# Patient Record
Sex: FEMALE | Race: WHITE | Employment: FULL TIME | URBAN - METROPOLITAN AREA
[De-identification: names, ages, dates, MRNs, and addresses within clinical notes are randomized per-mention and may not be internally consistent; named-entity substitution may affect disease eponyms.]

---

## 2017-07-14 ENCOUNTER — HOSPITAL ENCOUNTER (OUTPATIENT)
Dept: MRI IMAGING | Age: 62
Discharge: HOME | End: 2017-07-14
Attending: FAMILY MEDICINE
Payer: OTHER MISCELLANEOUS

## 2017-07-14 DIAGNOSIS — S49.91XA RIGHT SHOULDER INJURY, INITIAL ENCOUNTER: ICD-10-CM

## 2017-07-14 PROCEDURE — 73221 MRI JOINT UPR EXTREM W/O DYE: CPT

## 2017-08-21 ENCOUNTER — APPOINTMENT (OUTPATIENT)
Dept: PHYSICAL THERAPY | Age: 62
End: 2017-08-21

## 2017-09-18 ENCOUNTER — HOSPITAL ENCOUNTER (OUTPATIENT)
Dept: PHYSICAL THERAPY | Age: 62
Discharge: HOME | End: 2017-09-18
Payer: OTHER MISCELLANEOUS

## 2017-09-18 PROCEDURE — 97140 MANUAL THERAPY 1/> REGIONS: CPT

## 2017-09-18 PROCEDURE — 97535 SELF CARE MNGMENT TRAINING: CPT

## 2017-09-18 PROCEDURE — 97162 PT EVAL MOD COMPLEX 30 MIN: CPT

## 2017-09-18 NOTE — PROGRESS NOTES
PHYSICAL THERAPY - DAILY TREATMENT NOTE    Patient Name: Daniel Choe        Date: 2017  : 1955   YES Patient  Verified  Visit #:     Insurance: Payor: Micki Asa / Plan: [id number] / Product Type: Workers Comp /      In time: 10:15 Out time: 10:45   Total Treatment Time: 30     Medicare Time Tracking (below)   Total Timed Codes (min):  na 1:1 Treatment Time:  na     TREATMENT AREA =  Right shoulder pain [M25.511]    SUBJECTIVE  Pain Level (on 0 to 10 scale):    Medication Changes/New allergies or changes in medical history, any new surgeries or procedures? NO    If yes, update Summary List   Subjective Functional Status/Changes:  []  No changes reported     SEE IE          OBJECTIVE    10 min Manual Therapy: PROM FLx ER   Rationale:      decrease pain, increase ROM and increase tissue extensibility to improve patient's ability to reach/lift     min Patient Education:  YES  Reviewed HEP   []  Progressed/Changed HEP based on: Other Objective/Functional Measures:    SEE IE     Post Treatment Pain Level (on 0 to 10) scale:       ASSESSMENT  Assessment/Changes in Function:     SEE IE     []  See Progress Note/Recertification   Patient will continue to benefit from skilled PT services to modify and progress therapeutic interventions, address functional mobility deficits, address ROM deficits, address strength deficits, analyze and address soft tissue restrictions, analyze and cue movement patterns, analyze and modify body mechanics/ergonomics and assess and modify postural abnormalities to attain remaining goals.    Progress toward goals / Updated goals:         PLAN  []  Upgrade activities as tolerated YES Continue plan of care   []  Discharge due to :    []  Other:      Therapist: Earnestine Porras, PT, OCS, SCS, CSCS    Date: 2017 Time: 10:56 AM       Future Appointments  Date Time Provider Nicole Noguera   2017 3:30 PM Abbey Gill Yarelis Duggan [facility]   9/26/2017 1:00 PM Ledon Pluck, PTA [facility]   9/28/2017 12:00 PM Ledon Pluck, PTA [facility]   10/3/2017 1:00 PM Ledon Pluck, PTA [facility]   10/5/2017 12:00 PM Ledon Pluck, PTA [facility]   10/10/2017 1:30 PM Ledon Pluck, PTA [facility]   10/12/2017 12:00 PM Ledon Pluck, PTA [facility]   10/17/2017 1:00 PM Ledon Pluck, PTA [facility]   10/19/2017 2:00 PM Ledon Pluck, PTA [facility]

## 2017-09-18 NOTE — PROGRESS NOTES
[facility] PHYSICAL THERAPY  [address] [place] [address] [address], [address],[facility], [facility] - Phone: [phone]  Fax: [phone] / [address] [facility]  Patient Name: Sandeep Womack : 1955   Medical   Diagnosis: Right shoulder pain [M25.511] Treatment Diagnosis: Right shoulder pain [M25.511]   Onset Date: 9/15/17     Referral Source: Lauren Diaz MD [place] [facility]): 2017   Prior Hospitalization: See medical history Provider #: [id number]   Prior Level of Function: Pain free ADLs   Comorbidities: Fibromyalgia, HTN   Medications: Verified on Patient Summary List   The Plan of Care and following information is based on the information from the initial evaluation.   ===========================================================================================  Assessment / key information:  Sandeep Womack is a 63 y.o.  yo female with Dx of Right shoulder pain [M25.511]. She reports that she injured her R shoulder when she tried to reposition a patient at work on 2017. She had a rotator cuff repair/biceps tenodesis on 9/15/17. She currently rates her pain as 10/10 at worst, 6/10 at best, primarily located at anterior aspect of her R shoulder. Ojective Findings:  R Shoulder PROM: Flx:48 deg, ER: 2 deg. Pt instructed in HEP and will f/u in clinic for PT.  ===========================================================================================  Eval Complexity: History MEDIUM  Complexity : 1-2 comorbidities / personal factors will impact the outcome/ POC ;  Examination  MEDIUM Complexity : 3 Standardized tests and measures addressing body structure, function, activity limitation and / or participation in recreation ; Presentation MEDIUM Complexity : Evolving with changing characteristics ;   Decision Making MEDIUM Complexity : FOTO score of 26-74; Overall Complexity MEDIUM  Problem List: pain affecting function, decrease ROM, decrease strength, decrease ADL/ functional abilitiies, decrease activity tolerance and decrease flexibility/ joint mobility   Treatment Plan may include any combination of the following: Therapeutic exercise, Therapeutic activities, Neuromuscular re-education, Physical agent/modality, Manual therapy, Patient education, Self Care training and Functional mobility training  Patient / Family readiness to learn indicated by: asking questions, trying to perform skills and interest  Persons(s) to be included in education: patient (P)  Barriers to Learning/Limitations: no  Measures taken: FOTO = 29%   Patient Goal (s): Decrease pain    Patient self reported health status: good  Rehabilitation Potential: good   Short Term Goals: To be accomplished in  1-2  weeks:  1. Independent with HEP. 2. Decrease max pain 25-50% to assist with reaching/lifting    Long Term Goals: To be accomplished in  3-4  weeks:  1. Decrease max pain 50-75% to assist with reaching/lifting  2. Increase FOTO score to 40% to show functional improvment  3. Increase R shoulder PROM Flx and ER to WNL to assist with ADLS  Frequency / Duration:   Patient to be seen  2-3  times per week for 3-4  weeks:  Patient / Caregiver education and instruction: self care and exercises    Therapist Signature: Ludmila Connors DPT, OCS, SCS, CSCS Date: 5/74/7892   Certification Period: na Time: 10:57 AM   =================================================================================I certify that the above Physical Therapy Services are being furnished while the patient is under my care. I agree with the treatment plan and certify that this therapy is necessary. Physician Signature:        Date:       Time:     Please sign and return to [facility] or you may fax the signed copy to [phone]. Thank you.

## 2017-09-20 ENCOUNTER — HOSPITAL ENCOUNTER (OUTPATIENT)
Dept: PHYSICAL THERAPY | Age: 62
Discharge: HOME | End: 2017-09-20
Payer: OTHER MISCELLANEOUS

## 2017-09-20 PROCEDURE — 97110 THERAPEUTIC EXERCISES: CPT

## 2017-09-20 PROCEDURE — 97140 MANUAL THERAPY 1/> REGIONS: CPT

## 2017-09-20 NOTE — PROGRESS NOTES
PHYSICAL THERAPY - DAILY TREATMENT NOTE    Patient Name: Sharri Dawn        Date: 2017  : 1955   YES Patient  Verified  Visit #:     Insurance: Payor: Hanane Wolf / Plan: [id number] [facility] / Product Type: Workers Comp /      In time: 330 Out time: 415   Total Treatment Time: 39     Medicare Time Tracking (below)   Total Timed Codes (min):  35 1:1 Treatment Time:       TREATMENT AREA =  Right shoulder pain [M25.511]    SUBJECTIVE  Pain Level (on 0 to 10 scale):  7  / 10   Medication Changes/New allergies or changes in medical history, any new surgeries or procedures? NO    If yes, update Summary List   Subjective Functional Status/Changes:  []  No changes reported     \"The shoulder is hurting pretty bad. I feel like my neck and shoulders are really tense. \"        OBJECTIVE  Modalities Rationale:     decrease inflammation and decrease pain to improve patient's ability to perform pain free ADLs. min [] Estim, type/location:                                      []  att     []  unatt     []  w/US     []  w/ice    []  w/heat    min []  Mechanical Traction: type/lbs                   []  pro   []  sup   []  int   []  cont    []  before manual    []  after manual    min []  Ultrasound, settings/location:      min []  Iontophoresis w/ dexamethasone, location:                                               []  take home patch       []  in clinic   10 min [x]  Ice     []  Heat    location/position: Supine, (R) shoulder. min []  Vasopneumatic Device, press/temp:     min []  Other:    [x] Skin assessment post-treatment (if applicable):    [x]  intact    []  redness- no adverse reaction     []redness  adverse reaction:        15 min Therapeutic Exercise:  [x]  See flow sheet   Rationale:      increase ROM, increase strength and improve coordination to improve the patients ability to perform pain free shoulder ROM.       20 Min Manual Therapy: (R) shoulder PROM.  Distraction w/ oscillation. Rationale:      decrease pain, increase ROM, increase tissue extensibility and decrease trigger points to improve patient's ability to perform pain free ADLs. min Patient Education:  YES  Reviewed HEP   []  Progressed/Changed HEP based on: Other Objective/Functional Measures:    Scap squeeze, post shoulder rolls, wrist flex/ext x10 ea. Reviewed HEP exercises. Post Treatment Pain Level (on 0 to 10) scale:   7  / 10     ASSESSMENT  Assessment/Changes in Function:     Gentle PROM/oscillation/distraction only. Pt requires VC's to relax UE.      []  See Progress Note/Recertification   Patient will continue to benefit from skilled PT services to modify and progress therapeutic interventions, address functional mobility deficits, address ROM deficits, address strength deficits, analyze and address soft tissue restrictions, analyze and cue movement patterns, analyze and modify body mechanics/ergonomics and assess and modify postural abnormalities to attain remaining goals. Progress toward goals / Updated goals:    Initiated therex.       PLAN  [x]  Upgrade activities as tolerated YES Continue plan of care   []  Discharge due to :    []  Other:      Therapist: Karoline Ribera PTA    Date: 9/20/2017 Time: 4:35 PM     Future Appointments  Date Time Provider Nicole Noguera   9/26/2017 1:00 PM Karoline Ribera PTA [facility]   9/28/2017 12:00 PM Karoline Ribera [facility]   10/3/2017 1:00 PM Karoline Ribera PTA [facility]   10/5/2017 12:00 PM Karoline Ribera PTA [facility]   10/10/2017 1:30 PM Karoline Ribera PTA [facility]   10/12/2017 12:00 PM Karoline Ribera PTA [facility]   10/17/2017 1:00 PM Karoline Ribera [facility]   10/19/2017 2:00 PM Karoline Ribera [facility]

## 2017-09-26 ENCOUNTER — HOSPITAL ENCOUNTER (OUTPATIENT)
Dept: PHYSICAL THERAPY | Age: 62
Discharge: HOME | End: 2017-09-26
Payer: OTHER MISCELLANEOUS

## 2017-09-26 PROCEDURE — 97140 MANUAL THERAPY 1/> REGIONS: CPT

## 2017-09-26 PROCEDURE — 97110 THERAPEUTIC EXERCISES: CPT

## 2017-09-26 NOTE — PROGRESS NOTES
PHYSICAL THERAPY - DAILY TREATMENT NOTE    Patient Name: Verenice Clarke        Date: 2017  : 1955   YES Patient  Verified  Visit #:   3   of   12  Insurance: Payor: Junior Palm / Plan: [id number] [facility] / Product Type: Workers Comp /      In time: 1 Out time: 145   Total Treatment Time: 39     Medicare Time Tracking (below)   Total Timed Codes (min):  35 1:1 Treatment Time:       TREATMENT AREA =  Right shoulder pain [M25.511]    SUBJECTIVE  Pain Level (on 0 to 10 scale):  6  / 10   Medication Changes/New allergies or changes in medical history, any new surgeries or procedures? NO    If yes, update Summary List   Subjective Functional Status/Changes:  []  No changes reported     \"I'm wearing the sling except to shower. \"  Pt states she has some trouble with the swinging exercise (pendulums) at home. OBJECTIVE  Modalities Rationale:     decrease inflammation and decrease pain to improve patient's ability to perform pain free ADLs. min [] Estim, type/location:                                      []  att     []  unatt     []  w/US     []  w/ice    []  w/heat    min []  Mechanical Traction: type/lbs                   []  pro   []  sup   []  int   []  cont    []  before manual    []  after manual    min []  Ultrasound, settings/location:      min []  Iontophoresis w/ dexamethasone, location:                                               []  take home patch       []  in clinic   10 min [x]  Ice     []  Heat    location/position: Supine, (R) shoulder. min []  Vasopneumatic Device, press/temp:     min []  Other:    [x] Skin assessment post-treatment (if applicable):    [x]  intact    []  redness- no adverse reaction     []redness  adverse reaction:        15 min Therapeutic Exercise:  [x]  See flow sheet   Rationale:      increase ROM, increase strength, improve coordination and improve balance to improve the patients ability to perform pain free ADLs. 20 min Manual Therapy: (R) shoulder PROM. STM/DTM (R) periscapular mms. Rationale:      decrease pain, increase ROM, increase tissue extensibility and decrease trigger points to improve patient's ability to perform pain free ADLs. min Patient Education:  YES  Reviewed HEP   []  Progressed/Changed HEP based on: Other Objective/Functional Measures: Added pendulums (reviewed mechanics) and supine cane ER. Unable to tolerate wt shift secondary to pain. Post Treatment Pain Level (on 0 to 10) scale:   5  / 10     ASSESSMENT  Assessment/Changes in Function:     Reviewed HEP exercises and pt demonstrates good knowledge of HEP. []  See Progress Note/Recertification   Patient will continue to benefit from skilled PT services to modify and progress therapeutic interventions, address functional mobility deficits, address ROM deficits, address strength deficits, analyze and address soft tissue restrictions, analyze and cue movement patterns, analyze and modify body mechanics/ergonomics and assess and modify postural abnormalities to attain remaining goals. Progress toward goals / Updated goals:    STG #1 met.       PLAN  [x]  Upgrade activities as tolerated YES Continue plan of care   []  Discharge due to :    []  Other:      Therapist: Dario Zhang PTA    Date: 9/26/2017 Time: 1:37 PM     Future Appointments  Date Time Provider Nicole Noguera   9/28/2017 12:00 PM Dario Zhang PTA [facility]   10/3/2017 1:00 PM Dario Zhang PTA [facility]   10/5/2017 12:00 PM Dario Zhang PTA [facility]   10/10/2017 1:30 PM Dario Zhang PTA [facility]   10/12/2017 12:00 PM Dario Zhang PTA [facility]   10/17/2017 1:00 PM Dario Zhang PTA [facility]   10/19/2017 2:00 PM Dario Zhang [facility]

## 2017-09-28 ENCOUNTER — HOSPITAL ENCOUNTER (OUTPATIENT)
Dept: PHYSICAL THERAPY | Age: 62
Discharge: HOME | End: 2017-09-28
Payer: OTHER MISCELLANEOUS

## 2017-09-28 PROCEDURE — 97140 MANUAL THERAPY 1/> REGIONS: CPT

## 2017-09-28 PROCEDURE — 97110 THERAPEUTIC EXERCISES: CPT

## 2017-09-28 NOTE — PROGRESS NOTES
PHYSICAL THERAPY - DAILY TREATMENT NOTE    Patient Name: Melissa Mendiola        Date: 2017  : 1955   YES Patient  Verified  Visit #:     Insurance: Payor: Yolanda Rodrigues / Plan: [id number] [facility] / Product Type: Workers Comp /      In time: 12 Out time: 1250   Total Treatment Time: 48     Medicare Time Tracking (below)   Total Timed Codes (min):  40 1:1 Treatment Time:       TREATMENT AREA =  Right shoulder pain [M25.511]    SUBJECTIVE  Pain Level (on 0 to 10 scale):  6  / 10   Medication Changes/New allergies or changes in medical history, any new surgeries or procedures? NO    If yes, update Summary List   Subjective Functional Status/Changes:  []  No changes reported     \"I've done the pendulums at home, I think I've done a decent job of keeping the shoulder relaxed. \"        OBJECTIVE  Modalities Rationale:     decrease inflammation and decrease pain to improve patient's ability to perform pain free ADLs. min [] Estim, type/location:                                      []  att     []  unatt     []  w/US     []  w/ice    []  w/heat    min []  Mechanical Traction: type/lbs                   []  pro   []  sup   []  int   []  cont    []  before manual    []  after manual    min []  Ultrasound, settings/location:      min []  Iontophoresis w/ dexamethasone, location:                                               []  take home patch       []  in clinic   10 min [x]  Ice     []  Heat    location/position: Supine, (R) shoulder. min []  Vasopneumatic Device, press/temp:     min []  Other:    [x] Skin assessment post-treatment (if applicable):    [x]  intact    []  redness- no adverse reaction     []redness  adverse reaction:        15 min Therapeutic Exercise:  [x]  See flow sheet   Rationale:      increase ROM, increase strength and improve coordination to improve the patients ability to perform pain free ADLs.       25 Min Manual Therapy: (R) shoulder PROM.  STM/DTM and TPR (R) periscapular mms. Rationale:      decrease pain, increase ROM, increase tissue extensibility and decrease trigger points to improve patient's ability to perform pain free ADLs. min Patient Education:  YES  Reviewed HEP   []  Progressed/Changed HEP based on: Other Objective/Functional Measures:    Able to tolerate weightshifts, performed 10x5\". Post Treatment Pain Level (on 0 to 10) scale:   5  / 10     ASSESSMENT  Assessment/Changes in Function:     No exacerbation of symptoms with today's session. []  See Progress Note/Recertification   Patient will continue to benefit from skilled PT services to modify and progress therapeutic interventions, address functional mobility deficits, address ROM deficits, address strength deficits, analyze and address soft tissue restrictions, analyze and cue movement patterns, analyze and modify body mechanics/ergonomics and assess and modify postural abnormalities to attain remaining goals. Progress toward goals / Updated goals:    No change in progress toward LTG's with today's session.       PLAN  [x]  Upgrade activities as tolerated YES Continue plan of care   []  Discharge due to :    []  Other:      Therapist: Rolf Benites PTA    Date: 9/28/2017 Time: 12:30 PM     Future Appointments  Date Time Provider Nicole Noguera   10/3/2017 1:00 PM Rolf Benites PTA [facility]   10/5/2017 12:00 PM Rolf Benites PTA [facility]   10/10/2017 1:30 PM Rolf Benites PTA [facility]   10/12/2017 12:00 PM Rolf Benites PTA [facility]   10/17/2017 1:00 PM Rolf Benites PTA [facility]   10/19/2017 2:00 PM Rolf Benites PTA [facility]

## 2017-10-03 ENCOUNTER — HOSPITAL ENCOUNTER (OUTPATIENT)
Dept: PHYSICAL THERAPY | Age: 62
Discharge: HOME | End: 2017-10-03
Payer: OTHER MISCELLANEOUS

## 2017-10-03 PROCEDURE — 97140 MANUAL THERAPY 1/> REGIONS: CPT

## 2017-10-03 PROCEDURE — 97110 THERAPEUTIC EXERCISES: CPT

## 2017-10-03 NOTE — PROGRESS NOTES
PHYSICAL THERAPY - DAILY TREATMENT NOTE    Patient Name: Concepcion Plan        Date: 10/3/2017  : 1955   YES Patient  Verified  Visit #:      12  Insurance: Payor: Tiffanieannel Boast / Plan: [id number] / Product Type: Workers Comp /      In time: 1 Out time: 150   Total Treatment Time: 48     Medicare Time Tracking (below)   Total Timed Codes (min):  40 1:1 Treatment Time:       TREATMENT AREA =  Right shoulder pain [M25.511]    SUBJECTIVE  Pain Level (on 0 to 10 scale): 6   / 10   Medication Changes/New allergies or changes in medical history, any new surgeries or procedures? NO    If yes, update Summary List   Subjective Functional Status/Changes:  []  No changes reported     \"I've been doing my exercises. Taking less pain medication, so I can feel a bit more lately. \"           OBJECTIVE  Modalities Rationale:     decrease inflammation and decrease pain to improve patient's ability to perform pain free ADLs. min [] Estim, type/location:                                      []  att     []  unatt     []  w/US     []  w/ice    []  w/heat    min []  Mechanical Traction: type/lbs                   []  pro   []  sup   []  int   []  cont    []  before manual    []  after manual    min []  Ultrasound, settings/location:      min []  Iontophoresis w/ dexamethasone, location:                                               []  take home patch       []  in clinic   10 min [x]  Ice     []  Heat    location/position: Supine, (R) shoulder. min []  Vasopneumatic Device, press/temp:     min []  Other:    [x] Skin assessment post-treatment (if applicable):    [x]  intact    []  redness- no adverse reaction     []redness  adverse reaction:        15 min Therapeutic Exercise:  [x]  See flow sheet   Rationale:      increase ROM, increase strength and improve coordination to improve the patients ability to perform pain free ADLs.        25 min Manual Therapy: (R) shoulder PROM.  STM/DTM (R) periscapular mms. STM (R) Bicep. Rationale:      decrease pain, increase ROM, increase tissue extensibility and decrease trigger points to improve patient's ability to perform pain free ADLs. min Patient Education:  YES  Reviewed HEP   []  Progressed/Changed HEP based on: Other Objective/Functional Measures:    Able to resume table slides w/o pain. Therex per flow sheet. Post Treatment Pain Level (on 0 to 10) scale:   5  / 10     ASSESSMENT  Assessment/Changes in Function:     No exacerbation of symptoms with today's session. []  See Progress Note/Recertification   Patient will continue to benefit from skilled PT services to modify and progress therapeutic interventions, address functional mobility deficits, address ROM deficits, address strength deficits, analyze and address soft tissue restrictions, analyze and cue movement patterns, analyze and modify body mechanics/ergonomics and assess and modify postural abnormalities to attain remaining goals. Progress toward goals / Updated goals:    No change in progress toward LTG's with today's session.       PLAN  [x]  Upgrade activities as tolerated YES Continue plan of care   []  Discharge due to :    []  Other:      Therapist: Cori Cisneros PTA    Date: 10/3/2017 Time: 2:41 PM     Future Appointments  Date Time Provider Nicole Noguera   10/5/2017 12:00 PM Cori Cisneros PTA [facility]   10/10/2017 1:30 PM Cori Cisneros PTA [facility]   10/12/2017 12:00 PM Cori Cisneros PTA [facility]   10/17/2017 1:00 PM Cori Cisneros PTA [facility]   10/19/2017 2:00 PM Cori Cisneros PTA [facility]

## 2017-10-05 ENCOUNTER — HOSPITAL ENCOUNTER (OUTPATIENT)
Dept: PHYSICAL THERAPY | Age: 62
Discharge: HOME | End: 2017-10-05
Payer: OTHER MISCELLANEOUS

## 2017-10-05 PROCEDURE — 97110 THERAPEUTIC EXERCISES: CPT

## 2017-10-05 PROCEDURE — 97140 MANUAL THERAPY 1/> REGIONS: CPT

## 2017-10-05 NOTE — PROGRESS NOTES
PHYSICAL THERAPY - DAILY TREATMENT NOTE    Patient Name: Osmar Ingram        Date: 10/5/2017  : 1955   YES Patient  Verified  Visit #:     Insurance: Payor: Bennett Dinh / Plan: [id number] [facility] / Product Type: Workers Comp /      In time: 1155 Out time: 1245   Total Treatment Time: 48     Medicare Time Tracking (below)   Total Timed Codes (min):  50 1:1 Treatment Time:       TREATMENT AREA =  Right shoulder pain [M25.511]    SUBJECTIVE  Pain Level (on 0 to 10 scale):  1-2  / 10   Medication Changes/New allergies or changes in medical history, any new surgeries or procedures? NO    If yes, update Summary List   Subjective Functional Status/Changes:  []  No changes reported     \"I had trouble breathing starting yesterday and today. Feels like a sharp pain on the (L) side of my chest and it runs along the side of my ribcage. \"            OBJECTIVE    30 min Therapeutic Exercise:  [x]  See flow sheet   Rationale:      increase ROM, increase strength and improve coordination to improve the patients ability to perform pain free ADLs. 20 min Manual Therapy: (R) shoulder PROM. STM/DTM and TPR (R) periscapular mms. Rationale:      decrease pain, increase ROM, increase tissue extensibility and decrease trigger points to improve patient's ability to perform pain free ADLs. min Patient Education:  YES  Reviewed HEP   []  Progressed/Changed HEP based on: Other Objective/Functional Measures: Added UT stretch to improve cervical mobility, decrease stress on the shoulder.        Post Treatment Pain Level (on 0 to 10) scale:   1-2  / 10     ASSESSMENT  Assessment/Changes in Function:     PD modalities, states she will ice it at home.       []  See Progress Note/Recertification   Patient will continue to benefit from skilled PT services to modify and progress therapeutic interventions, address functional mobility deficits, address ROM deficits, address strength deficits, analyze and address soft tissue restrictions, analyze and cue movement patterns and analyze and modify body mechanics/ergonomics to attain remaining goals. Progress toward goals / Updated goals:    No change in progress toward LTG's with today's session.       PLAN  [x]  Upgrade activities as tolerated YES Continue plan of care   []  Discharge due to :    []  Other:      Therapist: Becky Bryant PTA    Date: 10/5/2017 Time: 12:42 PM     Future Appointments  Date Time Provider Nicole Noguera   10/10/2017 1:30 PM Becky Bryant PTA [facility]   10/12/2017 12:00 PM Becky Bryant PTA [facility]   10/17/2017 1:00 PM Becky Bryant PTA [facility]   10/19/2017 2:00 PM Becky Bryant [facility]

## 2017-10-10 ENCOUNTER — APPOINTMENT (OUTPATIENT)
Dept: PHYSICAL THERAPY | Age: 62
End: 2017-10-10
Payer: OTHER MISCELLANEOUS

## 2017-10-17 ENCOUNTER — HOSPITAL ENCOUNTER (OUTPATIENT)
Dept: PHYSICAL THERAPY | Age: 62
Discharge: HOME | End: 2017-10-17
Payer: OTHER MISCELLANEOUS

## 2017-10-17 PROCEDURE — 97110 THERAPEUTIC EXERCISES: CPT

## 2017-10-17 PROCEDURE — 97140 MANUAL THERAPY 1/> REGIONS: CPT

## 2017-10-17 NOTE — PROGRESS NOTES
PHYSICAL THERAPY - DAILY TREATMENT NOTE    Patient Name: Yeni Oneil        Date: 10/17/2017  : 1955   YES Patient  Verified  Visit #:     Insurance: Payor: [employer] / Plan: [id number] [place] [employer] / Product Type: Workers Comp /      In time: 1255 Out time: 145   Total Treatment Time: 48     Medicare Time Tracking (below)   Total Timed Codes (min):  40 1:1 Treatment Time:       TREATMENT AREA =  Right shoulder pain [M25.511]    SUBJECTIVE  Pain Level (on 0 to 10 scale):  2  / 10   Medication Changes/New allergies or changes in medical history, any new surgeries or procedures? NO    If yes, update Summary List   Subjective Functional Status/Changes:  []  No changes reported     Pt reporting recent max pain of 6/10 - happened when she overused her arm during light work. OBJECTIVE  Modalities Rationale:     decrease inflammation and decrease pain to improve patient's ability to perform pain free ADLs. min [] Estim, type/location:                                      []  att     []  unatt     []  w/US     []  w/ice    []  w/heat    min []  Mechanical Traction: type/lbs                   []  pro   []  sup   []  int   []  cont    []  before manual    []  after manual    min []  Ultrasound, settings/location:      min []  Iontophoresis w/ dexamethasone, location:                                               []  take home patch       []  in clinic   10 min [x]  Ice     []  Heat    location/position: Supine, (R) shoulder. min []  Vasopneumatic Device, press/temp:     min []  Other:    [x] Skin assessment post-treatment (if applicable):    [x]  intact    []  redness- no adverse reaction     []redness  adverse reaction:        25 min Therapeutic Exercise:  [x]  See flow sheet   Rationale:      increase ROM, increase strength and improve coordination to improve the patients ability to perform pain free ADLs.       15 Min Manual Therapy: (R) shoulder PROM.  TPR (R) periscapular mms. Rationale:      decrease pain, increase ROM, increase tissue extensibility and decrease trigger points to improve patient's ability to perform pain free ADLs. min Patient Education:  YES  Reviewed HEP   []  Progressed/Changed HEP based on: Other Objective/Functional Measures:    (R) shoulder PROM = flex 106 deg, ER 36 deg     FOTO = 47   GROC = +5      Post Treatment Pain Level (on 0 to 10) scale:   0   / 10     ASSESSMENT  Assessment/Changes in Function:     See PN     []  See Progress Note/Recertification   Patient will continue to benefit from skilled PT services to modify and progress therapeutic interventions, address functional mobility deficits, address ROM deficits, address strength deficits, analyze and address soft tissue restrictions, analyze and cue movement patterns, analyze and modify body mechanics/ergonomics and assess and modify postural abnormalities to attain remaining goals.    Progress toward goals / Updated goals:    See PN     PLAN  [x]  Upgrade activities as tolerated YES Continue plan of care   []  Discharge due to :    []  Other:      Therapist: Yuly Mireles PTA    Date: 10/17/2017 Time: 1:56 PM     Future Appointments  Date Time Provider Nicole Noguera   10/19/2017 2:00 PM Yuly Mireles PTA [facility]

## 2017-10-17 NOTE — PROGRESS NOTES
[facility] PHYSICAL THERAPY  [address] [place] [address] 201,[facility], [address] [facility] - Phone: [phone]  Fax: [phone]  PROGRESS NOTE  Patient Name: Susy Prader : 1955   Treatment/Medical Diagnosis: Right shoulder pain [M25.511]   Referral Source: Cindy Aragon MD     Date of Initial Visit:  Attended Visits: 7 Missed Visits: 1     SUMMARY OF TREATMENT  PT has consisted of initial evaluation, therapeutic exercises, HEP, manual therapy, patient education, and modalities. CURRENT STATUS  [facility]p RTC Repair and biceps tenodesis. Pt currently reports 6/10 max pain which occurred when she was doing some light housework. See below for current shoulder PROM. FOTO = 47, GROC = +5. Pt demonstrates (I) and compliance with current HEP exercises. Goal/Measure of Progress Goal Met? 1. Decrease max pain 50-75% to assist with reaching/lifting   Status at last Eval: 10/10 @ worst Current Status: 6/10 @ worst  progressing   2. Increase FOTO score to 40% to show functional improvment   Status at last Eval: 29 Current Status: 47 yes   3. Increase R shoulder PROM Flx and ER to WNL to assist with ADLS   Status at last Eval: Flex 48 deg   ER 2 deg  Current Status: Flex 106 deg   ER 36 deg  progressing     New Goals to be achieved in __4__  weeks:  1. Decrease max pain to </=4/10 to assist with reaching/lifting  2. Increase FOTO score to 59% to show functional improvment  3. Increase R shoulder PROM Flx and ER to WNL to assist with ADLS  RECOMMENDATIONS  Pt to continue 2x weekly for up to an additional 4 weeks to further improve shoulder ROM, initiate shoulder AROM per MD approval, and decrease pain. Thank you for this referral.   If you have any questions/comments please contact us directly at [phone]. Thank you for allowing us to assist in the care of your patient.   LPTA Signature: Angeline Lara PTA  Date: 10/17/2017 PT Signature:  Time: 2:31 PM   NOTE TO PHYSICIAN:  PLEASE COMPLETE THE ORDERS BELOW AND FAX TO   [facility]: [phone]  If you are unable to process this request in 24 hours please contact our office: [phone]    ___ I have read the above report and request that my patient continue as recommended.   ___ I have read the above report and request that my patient continue therapy with the following changes/special instructions:_________________________________________________________   ___ I have read the above report and request that my patient be discharged from therapy.      Physician Signature:        Date:       Time:

## 2017-10-25 ENCOUNTER — HOSPITAL ENCOUNTER (OUTPATIENT)
Dept: PHYSICAL THERAPY | Age: 62
Discharge: HOME | End: 2017-10-25
Payer: OTHER MISCELLANEOUS

## 2017-10-25 PROCEDURE — 97110 THERAPEUTIC EXERCISES: CPT

## 2017-10-25 PROCEDURE — 97140 MANUAL THERAPY 1/> REGIONS: CPT

## 2017-10-25 NOTE — PROGRESS NOTES
PHYSICAL THERAPY - DAILY TREATMENT NOTE    Patient Name: Roxanne Prasad        Date: 10/25/2017  : 1955   YES Patient  Verified  Visit #:     Insurance: Payor: Derek Jackman / Plan: [id number] [facility] / Product Type: Workers Comp /      In time: [employer] time: 1225   Total Treatment Time: 54     Medicare Time Tracking (below)   Total Timed Codes (min):  45 1:1 Treatment Time:       TREATMENT AREA =  Right shoulder pain [M25.511]    SUBJECTIVE  Pain Level (on 0 to 10 scale):  4  / 10   Medication Changes/New allergies or changes in medical history, any new surgeries or procedures? NO    If yes, update Summary List   Subjective Functional Status/Changes:  []  No changes reported     Pt reporting soreness today. \"I wasn't wearing the sling, and I went to use my arm. \"   States incident happened on  and she's been sore since then. OBJECTIVE  Modalities Rationale:     decrease inflammation and decrease pain to improve patient's ability to perform pain free ADLs. min [] Estim, type/location:                                      []  att     []  unatt     []  w/US     []  w/ice    []  w/heat    min []  Mechanical Traction: type/lbs                   []  pro   []  sup   []  int   []  cont    []  before manual    []  after manual    min []  Ultrasound, settings/location:      min []  Iontophoresis w/ dexamethasone, location:                                               []  take home patch       []  in clinic   10 min [x]  Ice     []  Heat    location/position: Supine, (R) shoulder.      min []  Vasopneumatic Device, press/temp:     min []  Other:    [x] Skin assessment post-treatment (if applicable):    [x]  intact    []  redness- no adverse reaction     []redness  adverse reaction:        30 min Therapeutic Exercise:  [x]  See flow sheet   Rationale:      increase ROM, increase strength and improve coordination to improve the patients ability to perform pain free ADLs. 15 Min Manual Therapy: STM/DTM (R) periscapular mms, t/s paraspinals, post cuff, (R) shoulder PROM. Rationale:      decrease pain, increase ROM, increase tissue extensibility and decrease trigger points to improve patient's ability to perform pain free ADLs. min Patient Education:  YES  Reviewed HEP   []  Progressed/Changed HEP based on: Other Objective/Functional Measures: Added supine AAROM flex, sidelying ER, and prone row to progress into AROM activities per protocol. Post Treatment Pain Level (on 0 to 10) scale:   2  / 10     ASSESSMENT  Assessment/Changes in Function:     ROM limited by pain today. []  See Progress Note/Recertification   Patient will continue to benefit from skilled PT services to modify and progress therapeutic interventions, address functional mobility deficits, address ROM deficits, address strength deficits, analyze and address soft tissue restrictions, analyze and cue movement patterns, analyze and modify body mechanics/ergonomics and assess and modify postural abnormalities to attain remaining goals. Progress toward goals / Updated goals:    Initiated light AAROM activities.        PLAN  [x]  Upgrade activities as tolerated YES Continue plan of care   []  Discharge due to :    []  Other:      Therapist: Idalmis Cochran PTA    Date: 10/25/2017 Time: 11:47 AM     Future Appointments  Date Time Provider Nicole Noguera   11/3/2017 10:30 AM Yudith Caba PT [facility]   11/6/2017 11:30 AM Idalmis Cochran PTA [facility]   11/8/2017 11:30 AM Idalmis Cochran PTA [facility]   11/13/2017 11:30 AM Idalmis Cochran PTA [facility]   11/15/2017 11:30 AM Idalmis Cochran PTA [facility]

## 2017-10-31 ENCOUNTER — APPOINTMENT (OUTPATIENT)
Dept: PHYSICAL THERAPY | Age: 62
End: 2017-10-31
Payer: OTHER MISCELLANEOUS

## 2017-11-03 ENCOUNTER — APPOINTMENT (OUTPATIENT)
Dept: PHYSICAL THERAPY | Age: 62
End: 2017-11-03
Payer: OTHER MISCELLANEOUS

## 2017-11-08 ENCOUNTER — HOSPITAL ENCOUNTER (OUTPATIENT)
Dept: PHYSICAL THERAPY | Age: 62
Discharge: HOME | End: 2017-11-08
Payer: OTHER MISCELLANEOUS

## 2017-11-08 PROCEDURE — 97110 THERAPEUTIC EXERCISES: CPT

## 2017-11-08 PROCEDURE — 97140 MANUAL THERAPY 1/> REGIONS: CPT

## 2017-11-08 NOTE — PROGRESS NOTES
PHYSICAL THERAPY - DAILY TREATMENT NOTE    Patient Name: Melissa Mendiola        Date: 2017  : 1955   YES Patient  Verified  Visit #:     Insurance: Payor: /      In time: 330 Out time: 425   Total Treatment Time: 55     Medicare Time Tracking (below)   Total Timed Codes (min):  55 1:1 Treatment Time:       TREATMENT AREA =  Right shoulder pain [M25.511]    SUBJECTIVE  Pain Level (on 0 to 10 scale):  4  / 10   Medication Changes/New allergies or changes in medical history, any new surgeries or procedures? NO    If yes, update Summary List   Subjective Functional Status/Changes:  []  No changes reported     No new complaints. Reports compliance with HEP. OBJECTIVE  40 min Therapeutic Exercise:  [x]  See flow sheet   Rationale:      increase ROM, increase strength and improve coordination to improve the patients ability to perform pain free overhead ADLs. 15 Min Manual Therapy: (R) shoulder PROM. Rationale:      decrease pain, increase ROM, increase tissue extensibility and decrease trigger points  to improve patient's ability to perform pain free ADLs. min Patient Education:  YES  Reviewed HEP   []  Progressed/Changed HEP based on: Other Objective/Functional Measures: Therex per flow sheet. (R) shoulder flex AAROM = 118 deg    Post Treatment Pain Level (on 0 to 10) scale:   4  / 10     ASSESSMENT  Assessment/Changes in Function:     No increase in pain during today's session. []  See Progress Note/Recertification   Patient will continue to benefit from skilled PT services to modify and progress therapeutic interventions, address functional mobility deficits, address ROM deficits, address strength deficits, analyze and address soft tissue restrictions, analyze and cue movement patterns, analyze and modify body mechanics/ergonomics and assess and modify postural abnormalities to attain remaining goals.    Progress toward goals / Updated goals:    Progressing toward LTG #3 w/ improvement in shoulder AAROM.        PLAN  [x]  Upgrade activities as tolerated YES Continue plan of care   []  Discharge due to :    []  Other:      Therapist: Jing Gtz PTA    Date: 11/8/2017 Time: 4:06 PM     Future Appointments  Date Time Provider Nicole Noguera   11/13/2017 3:30 PM Jing Gtz PTA [facility]   11/15/2017 3:30 PM Jing Gtz PTA [facility]

## 2017-11-15 ENCOUNTER — HOSPITAL ENCOUNTER (OUTPATIENT)
Dept: PHYSICAL THERAPY | Age: 62
Discharge: HOME | End: 2017-11-15
Payer: OTHER MISCELLANEOUS

## 2017-11-15 PROCEDURE — 97140 MANUAL THERAPY 1/> REGIONS: CPT

## 2017-11-15 PROCEDURE — 97110 THERAPEUTIC EXERCISES: CPT

## 2017-11-15 NOTE — PROGRESS NOTES
Min Manual Therapy:  (R) shoulder PROM. STM/DTM and TPR (R) shoulder. Rationale:      decrease pain, increase ROM, increase tissue extensibility and decrease trigger points to improve patient's ability to perform pain free ADLs. min Patient Education:  YES  Reviewed HEP   []  Progressed/Changed HEP based on: Other Objective/Functional Measures: Therex per flow sheet. Added standing scap, prone horizontal abd to improve shoulder stability/strength for ADLs. PROM Flex = 128 deg  PROM ER = 45 deg      Post Treatment Pain Level (on 0 to 10) scale:   2-3  / 10     ASSESSMENT  Assessment/Changes in Function:     Steady improvement in shoulder ROM. Remains painful/tight @ end range. []  See Progress Note/Recertification   Patient will continue to benefit from skilled PT services to modify and progress therapeutic interventions, address functional mobility deficits, address ROM deficits, address strength deficits, analyze and address soft tissue restrictions, analyze and cue movement patterns, analyze and modify body mechanics/ergonomics and assess and modify postural abnormalities to attain remaining goals. Progress toward goals / Updated goals:    Progressing toward LTG #3. PLAN  [x]  Upgrade activities as tolerated YES Continue plan of care   []  Discharge due to :    []  Other:      Therapist: Rafael Chatterjee PTA    Date: 11/15/2017 Time: 4:06 PM     No future appointments.

## 2017-11-22 ENCOUNTER — HOSPITAL ENCOUNTER (OUTPATIENT)
Dept: PHYSICAL THERAPY | Age: 62
Discharge: HOME | End: 2017-11-22
Payer: OTHER MISCELLANEOUS

## 2017-11-28 ENCOUNTER — APPOINTMENT (OUTPATIENT)
Dept: PHYSICAL THERAPY | Age: 62
End: 2017-11-28
Payer: OTHER MISCELLANEOUS

## 2017-11-30 ENCOUNTER — APPOINTMENT (OUTPATIENT)
Dept: PHYSICAL THERAPY | Age: 62
End: 2017-11-30
Payer: OTHER MISCELLANEOUS

## 2017-12-04 ENCOUNTER — APPOINTMENT (OUTPATIENT)
Dept: PHYSICAL THERAPY | Age: 62
End: 2017-12-04
Payer: OTHER MISCELLANEOUS

## 2017-12-06 ENCOUNTER — APPOINTMENT (OUTPATIENT)
Dept: PHYSICAL THERAPY | Age: 62
End: 2017-12-06
Payer: OTHER MISCELLANEOUS

## 2017-12-13 ENCOUNTER — HOSPITAL ENCOUNTER (OUTPATIENT)
Dept: PHYSICAL THERAPY | Age: 62
Discharge: HOME | End: 2017-12-13
Payer: OTHER MISCELLANEOUS

## 2017-12-13 PROCEDURE — 97140 MANUAL THERAPY 1/> REGIONS: CPT

## 2017-12-13 PROCEDURE — 97162 PT EVAL MOD COMPLEX 30 MIN: CPT

## 2017-12-13 NOTE — PROGRESS NOTES
[address] [facility] PHYSICAL THERAPY  [address] [facility] [address], [address] - Phone: [phone]  Fax: [phone] / [address] [facility]  Patient Name: Roxanna Norris : 1955   Medical   Diagnosis: Right shoulder pain [M25.511] Treatment Diagnosis: R RCR (small tear)   Onset Date: 9/15/2017 DOS     Referral Source: Bethany Evans MD [place] [facility]): 2017   Prior Hospitalization: See medical history Provider #: [id number]   Prior Level of Function: Working as RN   Comorbidities: back pain, HTN, fibromyalgia   Medications: Verified on Patient Summary List   The Plan of Care and following information is based on the information from the initial evaluation.   ===========================================================================================  Assessment / key information:  Patient is a 63y.o. year old female s/p R RCR and biceps tenodesis on 9/15/2017. Patient reports she has received previous PT for this injury at our clinic in [facility]. States she was unable to make it to all of her appointments and was discharged. States VANEGAS has ordered further PT. Objective findings: 1) decreased AROM/PROM in R shoulder, 2) decreased R scapular mobility 3) decreased strength in R shoulder/scapular musculature (excessive outward rotation of R scapula with active elevation of R UE). Patient with a Functional Status score of 52/100 on FOTO (Focused on Therapeutic Outcomes), which corresponds to a functional limitation of 48%. Patient will benefit from skilled PT services to address these issues.     ROM:  [] Unable to assess at this time                                           AROM                                                              PROM    Left Right   Left Right   Flexion 160 95 Flexion   140   Extension 50 40 Extension       Scaption/ 70 Scaption/ABD   95   ER @ 0 Degrees   45 ER @ 0 Degrees       ER @ 90 Degrees 90   ER @ 90 Degrees   55   IR (HBB) T 8 To gluteals IR @ 90 Degrees           ===========================================================================================  Eval Complexity: History: HIGH Complexity :3+ comorbidities / personal factors will impact the outcome/ POC Exam:MEDIUM Complexity : 3 Standardized tests and measures addressing body structure, function, activity limitation and / or participation in recreation  Presentation: MEDIUM Complexity : Evolving with changing characteristics  Clinical Decision Making:MEDIUM Complexity : FOTO score of 26-74Overall Complexity:MEDIUM    Problem List: pain affecting function, decrease ROM, decrease strength, edema affecting function, decrease ADL/ functional abilitiies, decrease activity tolerance, decrease flexibility/ joint mobility and decrease transfer abilities   Treatment Plan may include any combination of the following: Therapeutic exercise, Therapeutic activities, Neuromuscular re-education, Physical agent/modality, Manual therapy and Patient education  Patient / Family readiness to learn indicated by: asking questions, trying to perform skills and interest  Persons(s) to be included in education: patient (P)  Barriers to Learning/Limitations: None  Measures taken:    Patient Goal (s): \"strengthen\"   Patient self reported health status: good  Rehabilitation Potential: good   Short Term Goals: To be accomplished in  3 weeks:  1. Patient will increase AROM R shoulder flexion to 115 degrees to improve overhead reach. 2.  Patient will increase strength with R shoulder ER to 3/5 to improve dynamic stability with ADL's.  3.  Patient will be compliant with HEP.  Long Term Goals: To be accomplished in  6  weeks:  1. Patient will increase AROM R shoulder flexion to 140 degrees to improve overhead reach.   2.  Patient will increase strength with R shoulder ER to 4/5 to improve dynamic stability with ADL's.  3. Patient will increase FOTO Functional Status score to 62/100 to indicate decreased functional limitations. Frequency / Duration:   Patient to be seen  2-3  times per week for 6-8  weeks:  Patient / Caregiver education and instruction: self care and exercises  G-Codes (GP): DELMER  Therapist Signature: Brittaney Abel PT Date: 85/57/7548   Certification Period: NA Time: 11:22 AM   ===========================================================================================  I certify that the above Physical Therapy Services are being furnished while the patient is under my care. I agree with the treatment plan and certify that this therapy is necessary. Physician Signature:        Date:       Time:     Please sign and return to [employer] or you may fax the signed copy to [phone]. Thank you.

## 2017-12-13 NOTE — PROGRESS NOTES
PHYSICAL THERAPY - DAILY TREATMENT NOTE    Patient Name: Lavelle Murillo        Date: 2017  : 1955   YES Patient  Verified  Visit #:   1     Insurance: Payor: Peggy Haley / Plan: [id number] [facility] / Product Type: Workers Comp /      In time: 9:05 Out time: 9:35   Total Treatment Time: 30     Medicare Time Tracking (below)   Total Timed Codes (min):  NA 1:1 Treatment Time:  NA     TREATMENT AREA =  Right shoulder pain [M25.511]    SUBJECTIVE    Pain Level (on 0 to 10 scale):  6  / 10   Medication Changes/New allergies or changes in medical history, any new surgeries or procedures? NO    If yes, update Summary List   Subjective Functional Status/Changes:  []  No changes reported     Patient reports she had a R RCR and biceps tenodesis on 9/15/2017. [facility] but she missed some appointments due to her work schedule and was discharged. States she saw the MD recently, who stated that she needed more PT. States she works near our clinic and feels she will be able to get to our clinic at her appointment time. States she still has pain and stiffness in the R shoulder and has difficulty performing ADL's with her R UE. 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   She is an RN at [employer].          OBJECTIVE    Physical Therapy Evaluation - Shoulder    Previous treatment: previous PT [facility]     Posture: [] Poor    [x] Fair    [] Good    Describe:    ROM:  [] Unable to assess at this time                                           AROM                                                              PROM   Left Right  Left Right   Flexion 160 95 Flexion  140   Extension 50 40 Extension     Scaption/ 70 Scaption/ABD  95   ER @ 0 Degrees  45 ER @ 0 Degrees     ER @ 90 Degrees 90  ER @ 90 Degrees  55   IR (HBB) T 8 To gluteals IR @ 90 Degrees       End Feel / Painful Arc:    Strength:   [x] Unable to assess at this time                                                                            L (1-5) R (1-5) Pain   Flexors   [] Yes   [] No   Abductors   [] Yes   [] No   External Rotators   [] Yes   [] No   Internal Rotators   [] Yes   [] No   Supraspinatus   [] Yes   [] No   Serratus Anterior   [] Yes   [] No   Lower Trapezius   [] Yes   [] No   Elbow Flexion   [] Yes   [] No   Elbow Extension   [] Yes   [] No       Scapulohumoral Control / Rhythm:  Able to eccentrically lower with good control? Left: [x] Yes   [] No     Right: [x] Yes   [] No    Noted excessive outward rotation of R scapula with flexion    Palpation  [] Min  [x] Mod  [] Severe    Location: anterior shoulder  [] Min  [] Mod  [] Severe    Location:  [] Min  [] Mod  [] Severe    Location:  Adson's Test  [] Pos   [] Neg Drop arm  [] Pos   [] Neg  Cross Arm  [] Pos   [] Neg ER lag    [] Pos   [] Neg  Neer's Test  [] Pos   [] Neg Clunk Test  [] Pos   [] Neg  Hawkin's Test  [] Pos   [] Neg AC Joint  [] Pos   [] Neg  Speed's Test  [] Pos   [] Neg Lift off   [] Pos   [] Neg  Empty Can  [] Pos   [] Neg Pectoral Tightness [] Pos   [] Neg  Anterior Apprehension [] Pos   [] Neg   Posterior Apprehension [] Pos   [] Neg    10 min Manual Therapy: Grade 2-3 inf/post GH joint mobs; PROM to R shoulder   Rationale:      decrease pain, increase ROM and increase tissue extensibility to improve patient's ability to perform ADL's and work duties with R UE and decreased pain.      min Patient Education:  YES  Reviewed HEP   []  Progressed/Changed HEP based on:   Cont HEP     Other Objective/Functional Measures:    See eval     Post Treatment Pain Level (on 0 to 10) scale:   6  / 10     ASSESSMENT  Assessment/Changes in Function:     Justification for Eval Code Complexity:  Patient History (low 0, mod 1-2, high 3-4): high (back pain, HTN, fibromyalgia)  Examination (low 1-2, mod 3+, high 4+): mod (see above)  Clinical Presentation (low stable or uncomplicated, mod evolving or changing, high unstable or unpredictable): mod  Clinical Decision Making (low , mod 26-74, high 1-25): FOTO = 52/100 mod     []  See Progress Note/Recertification   Patient will continue to benefit from skilled PT services to modify and progress therapeutic interventions, address functional mobility deficits, address ROM deficits, address strength deficits, analyze and address soft tissue restrictions, analyze and cue movement patterns, analyze and modify body mechanics/ergonomics, assess and modify postural abnormalities and instruct in home and community integration to attain remaining goals. Progress toward goals / Updated goals:    Goals established. PLAN    [x]  Upgrade activities as tolerated YES Continue plan of care   []  Discharge due to :    []  Other:      Therapist: Venita Schuler PT    Date: 12/13/2017 Time: 9:18 AM     No future appointments.

## 2017-12-18 ENCOUNTER — HOSPITAL ENCOUNTER (OUTPATIENT)
Dept: PHYSICAL THERAPY | Age: 62
Discharge: HOME | End: 2017-12-18
Payer: OTHER MISCELLANEOUS

## 2017-12-18 PROCEDURE — 97110 THERAPEUTIC EXERCISES: CPT

## 2017-12-18 PROCEDURE — 97140 MANUAL THERAPY 1/> REGIONS: CPT

## 2017-12-18 NOTE — PROGRESS NOTES
PHYSICAL THERAPY - DAILY TREATMENT NOTE    Patient Name: Susy Prader        Date: 2017  : 1955   YES Patient  Verified  Visit #:   2   of     Insurance: Payor: Joey Navarrete / Plan: [id number] [facility] / Product Type: Workers Comp /      In time: 4:00 Out time: 5:03   Total Treatment Time: 61     Medicare Time Tracking (below)   Total Timed Codes (min):  63 1:1 Treatment Time:  63     TREATMENT AREA =  R RCR (Small Tear)    SUBJECTIVE    Pain Level (on 0 to 10 scale):  4  / 10   Medication Changes/New allergies or changes in medical history, any new surgeries or procedures? NO    If yes, update Summary List   Subjective Functional Status/Changes:  []  No changes reported     \"Today, I've just had a 4. My shoulder got tired at the end of the day moving the mouse around all day. OBJECTIVE    Modalities Rationale:   pallative   min [] Estim, type/location:                                      []  att     []  unatt     []  w/US     []  w/ice    []  w/heat    min []  Mechanical Traction: type/lbs                   []  pro   []  sup   []  int   []  cont    []  before manual    []  after manual    min []  Ultrasound, settings/location:      min []  Iontophoresis w/ dexamethasone, location:                                               []  take home patch-6hour remove at        []  in clinic   10 min [x]  Ice     []  Heat    location/position:     min []  Vasopneumatic Device, press/temp:     min []  Other:    [x] Skin assessment post-treatment (if applicable):    [x]  intact    []  redness- no adverse reaction     []redness  adverse reaction:      43 min Therapeutic Exercise:  [x]  See flow sheet   Rationale:      increase ROM, increase strength and improve coordination to improve the patients ability to perform ADLs with less pain. 10 min Manual Therapy: PROM stretching in FLex, ABd and ER.     Rationale:      decrease pain, increase ROM and increase tissue extensibility to improve patient's ability to return to prior level of physical activity. min Patient Education:  YES  Reviewed HEP   []  Progressed/Changed HEP based on: Other Objective/Functional Measures:         Post Treatment Pain Level (on 0 to 10) scale:   3-4  / 10     ASSESSMENT    Assessment/Changes in Function:     Pt had moderate pain with AAROM ex but was able to tolerate. Pt limited in flex and abd ROM by pain. Discussed ROM therex for HEP and importance of. Pt received CP post tx.      []  See Progress Note/Recertification   Patient will continue to benefit from skilled PT services to analyze,, cue,, progress,, modify,, demonstrate,, instruct, and address, movement patterns,, therapeutic interventions,, postural abnormalities,, soft tissue restrictions,, ROM,, strength,, functional mobility,, body mechanics/ergonomics, and home and community integration, to attain remaining goals. Progress toward goals / Updated goals: · Short Term Goals: To be accomplished in  3 weeks:  1. Patient will increase AROM R shoulder flexion to 115 degrees to improve overhead reach. 2.  Patient will increase strength with R shoulder ER to 3/5 to improve dynamic stability with ADL's.  3.  Patient will be compliant with HEP.    · Long Term Goals: To be accomplished in  6  weeks:  1. Patient will increase AROM R shoulder flexion to 140 degrees to improve overhead reach. 2.  Patient will increase strength with R shoulder ER to 4/5 to improve dynamic stability with ADL's.  3.  Patient will increase FOTO Functional Status score to 62/100 to indicate decreased functional limitations.      PLAN    [x]  Upgrade activities as tolerated YES Continue plan of care   []  Discharge due to :    []  Other:      Therapist: CHRISTINA Bhatia    Date: 12/18/2017 Time: 4:07 PM   Future Appointments  Date Time Provider Nicole Noguera   12/20/2017 4:00 PM [address]   12/22/2017 2:30 PM [facility] PT [address]   12/26/2017 4:30 PM Martine Oleary, PT [facility]   12/28/2017 4:00 PM [facility] PT [address]

## 2017-12-20 ENCOUNTER — HOSPITAL ENCOUNTER (OUTPATIENT)
Dept: PHYSICAL THERAPY | Age: 62
Discharge: HOME | End: 2017-12-20
Payer: OTHER MISCELLANEOUS

## 2017-12-20 PROCEDURE — 97140 MANUAL THERAPY 1/> REGIONS: CPT

## 2017-12-20 PROCEDURE — 97110 THERAPEUTIC EXERCISES: CPT

## 2017-12-20 NOTE — PROGRESS NOTES
PHYSICAL THERAPY - DAILY TREATMENT NOTE    Patient Name: Roxanna Norris        Date: 2017  : 1955   YES Patient  Verified  Visit #:   3     Insurance: Payor: Minna Chakraborty / Plan: [id number] [facility] / Product Type: Workers Comp /      In time: 4:00 Out time: 5:05   Total Treatment Time: 65     Medicare Time Tracking (below)   Total Timed Codes (min):  NA 1:1 Treatment Time:  NA     TREATMENT AREA =  R RCR (Small Tear)    SUBJECTIVE    Pain Level (on 0 to 10 scale):  5-6   10   Medication Changes/New allergies or changes in medical history, any new surgeries or procedures? NO    If yes, update Summary List   Subjective Functional Status/Changes:  []  No changes reported     \"I didn't do much but my shoulder is really stiff. \"          OBJECTIVE    Modalities Rationale:    pallative   min [] Estim, type/location:                                      []  att     []  unatt     []  w/US     []  w/ice    []  w/heat    min []  Mechanical Traction: type/lbs                   []  pro   []  sup   []  int   []  cont    []  before manual    []  after manual    min []  Ultrasound, settings/location:      min []  Iontophoresis w/ dexamethasone, location:                                               []  take home patch-6hour remove at        []  in clinic   10 min [x]  Ice     []  Heat    location/position:     min []  Vasopneumatic Device, press/temp:     min []  Other:    [x] Skin assessment post-treatment (if applicable):    [x]  intact    []  redness- no adverse reaction     []redness  adverse reaction:      45 min Therapeutic Exercise:  [x]  See flow sheet   Rationale:      increase ROM, increase strength and improve coordination to improve the patients ability to perform ADLs with less pain.       10 min Manual Therapy: Distraction with Grade 1 mobs, PROM flex, abd and ER   Rationale:      decrease pain, increase ROM and increase tissue extensibility to improve patient's ability to perform ADLs with less pain. min Patient Education:  YES  Reviewed HEP   []  Progressed/Changed HEP based on: Other Objective/Functional Measures:    AROM: Flex - 118, Abd - 90      Post Treatment Pain Level (on 0 to 10) scale:   0  / 10     ASSESSMENT    Assessment/Changes in Function:     Pt tolerated ex well with moderately increased pain and discomfort. Pt shows greater pain increases with abd rather than flex. Pt only able to tolerate grade 1 mobs to shoulder without pain increase. Pt received CP post tx.      []  See Progress Note/Recertification   Patient will continue to benefit from skilled PT services to analyze,, cue,, progress,, modify,, demonstrate,, instruct, and address, movement patterns,, therapeutic interventions,, postural abnormalities,, soft tissue restrictions,, ROM,, strength,, functional mobility,, body mechanics/ergonomics, and home and community integration, to attain remaining goals. Progress toward goals / Updated goals: · Short Term Goals: To be accomplished in  3 weeks:  1.  Patient will increase AROM R shoulder flexion to 115 degrees to improve overhead reach. AROM:   2.  Patient will increase strength with R shoulder ER to 3/5 to improve dynamic stability with ADL's. 3.  Patient will be compliant with HEP.     · Long Term Goals: To be accomplished in  6  weeks:  1.  Patient will increase AROM R shoulder flexion to 140 degrees to improve overhead reach. 2.  Patient will increase strength with R shoulder ER to 4/5 to improve dynamic stability with ADL's. 3.  Patient will increase FOTO Functional Status score to 62/100 to indicate decreased functional limitations.      PLAN    [x]  Upgrade activities as tolerated YES Continue plan of care   []  Discharge due to :    []  Other:      Therapist: Rosco Goodpasture    Date: 12/20/2017 Time: 4:04 PM   Future Appointments  Date Time Provider Nicoel Noguera   12/22/2017 2:30 PM [place] [address] [facility]   2017 4:30 PM Ashley , PT [facility]   2017 4:00 PM [facility] PT [address] [facility]

## 2017-12-22 ENCOUNTER — HOSPITAL ENCOUNTER (OUTPATIENT)
Dept: PHYSICAL THERAPY | Age: 62
Discharge: HOME | End: 2017-12-22
Payer: OTHER MISCELLANEOUS

## 2017-12-22 PROCEDURE — 97140 MANUAL THERAPY 1/> REGIONS: CPT

## 2017-12-22 PROCEDURE — 97110 THERAPEUTIC EXERCISES: CPT

## 2017-12-22 NOTE — PROGRESS NOTES
PHYSICAL THERAPY - DAILY TREATMENT NOTE    Patient Name: Lavelle Murillo        Date: 2017  : 1955   YES Patient  Verified  Visit #:   4     Insurance: Payor: Peggy Haley / Plan: [id number] [facility] / Product Type: Workers Comp /      In time: 2:22 Out time: 3:05   Total Treatment Time: 37     Medicare Time Tracking (below)   Total Timed Codes (min):  NA 1:1 Treatment Time:  NA     TREATMENT AREA =  R RCR (Small Tear)    SUBJECTIVE    Pain Level (on 0 to 10 scale):  3  / 10   Medication Changes/New allergies or changes in medical history, any new surgeries or procedures? YES    If yes, update Summary List   Subjective Functional Status/Changes:  []  No changes reported     \"My shoulder doesn't fell that bad but I have a terrible Headache. \"          OBJECTIVE    Modalities Rationale:    pallitive   min [] Estim, type/location:                                      []  att     []  unatt     []  w/US     []  w/ice    []  w/heat    min []  Mechanical Traction: type/lbs                   []  pro   []  sup   []  int   []  cont    []  before manual    []  after manual    min []  Ultrasound, settings/location:      min []  Iontophoresis w/ dexamethasone, location:                                               []  take home patch-6hour remove at        []  in clinic   10 min [x]  Ice     []  Heat    location/position:     min []  Vasopneumatic Device, press/temp:     min []  Other:    [x] Skin assessment post-treatment (if applicable):    [x]  intact    []  redness- no adverse reaction     []redness  adverse reaction:      23 min Therapeutic Exercise:  [x]  See flow sheet   Rationale:      increase ROM, increase strength and improve coordination to improve the patients ability to perform ADLs with less pain.       10 min Manual Therapy: Posterior and inferior mobs grade 1-2, PROM Flex, Abd and ER   Rationale:      decrease pain, increase ROM and increase tissue extensibility to improve patient's ability to perform ADLs with less pain. min Patient Education:  YES  Reviewed HEP   []  Progressed/Changed HEP based on: Other Objective/Functional Measures:         Post Treatment Pain Level (on 0 to 10) scale:   0  / 10     ASSESSMENT    Assessment/Changes in Function:     Pt arrived with headache symptoms and requested decreased intensity in tx. Pt denied strengthening ex but performed ROM. Pt received manual stretching and CP post tx.      []  See Progress Note/Recertification   Patient will continue to benefit from skilled PT services to analyze,, cue,, progress,, modify,, demonstrate,, instruct, and address, movement patterns,, therapeutic interventions,, postural abnormalities,, soft tissue restrictions,, ROM,, strength,, functional mobility,, body mechanics/ergonomics, and home and community integration, to attain remaining goals. Progress toward goals / Updated goals: · Short Term Goals: To be accomplished in  3 weeks:  1.  Patient will increase AROM R shoulder flexion to 115 degrees to improve overhead reach. AROM:   2.  Patient will increase strength with R shoulder ER to 3/5 to improve dynamic stability with ADL's. 3.  Patient will be compliant with HEP.     · Long Term Goals: To be accomplished in  6  weeks:  1.  Patient will increase AROM R shoulder flexion to 140 degrees to improve overhead reach. 2.  Patient will increase strength with R shoulder ER to 4/5 to improve dynamic stability with ADL's. 3.  Patient will increase FOTO Functional Status score to 62/100 to indicate decreased functional limitations.      PLAN    [x]  Upgrade activities as tolerated YES Continue plan of care   []  Discharge due to :    []  Other:      Therapist: CHRISTINA Sepulveda    Date: 12/22/2017 Time: 2:33 PM   Future Appointments  Date Time Provider Nicole Noguera   12/26/2017 4:30 PM Celia Anglin, [facility]   12/28/2017 4:00 PM [place] [id number] [facility] [address]

## 2017-12-26 ENCOUNTER — HOSPITAL ENCOUNTER (OUTPATIENT)
Dept: PHYSICAL THERAPY | Age: 62
End: 2017-12-26
Payer: OTHER MISCELLANEOUS

## 2017-12-28 ENCOUNTER — HOSPITAL ENCOUNTER (OUTPATIENT)
Dept: PHYSICAL THERAPY | Age: 62
Discharge: HOME | End: 2017-12-28
Payer: OTHER MISCELLANEOUS

## 2017-12-28 PROCEDURE — 97110 THERAPEUTIC EXERCISES: CPT

## 2017-12-28 PROCEDURE — 97140 MANUAL THERAPY 1/> REGIONS: CPT

## 2017-12-28 NOTE — PROGRESS NOTES
PHYSICAL THERAPY - DAILY TREATMENT NOTE    Patient Name: Cruz Hogue        Date: 2017  : 1955   YES Patient  Verified  Visit #:   5   of   12-15  Insurance: Payor: Chantal Gandhi / Plan: [id number] [facility] / Product Type: Workers Comp /      In time: 4:02 Out time: 5:01   Total Treatment Time: 61     Medicare Time Tracking (below)   Total Timed Codes (min):  NA 1:1 Treatment Time:  NA     TREATMENT AREA =  R RCR (Small Tear)    SUBJECTIVE    Pain Level (on 0 to 10 scale):  2-3  / 10   Medication Changes/New allergies or changes in medical history, any new surgeries or procedures? NO    If yes, update Summary List   Subjective Functional Status/Changes:  []  No changes reported     \"Work was very slow so we didn't have a lot to do. \"          OBJECTIVE    Modalities Rationale:    palliative   min [] Estim, type/location:                                      []  att     []  unatt     []  w/US     []  w/ice    []  w/heat    min []  Mechanical Traction: type/lbs                   []  pro   []  sup   []  int   []  cont    []  before manual    []  after manual    min []  Ultrasound, settings/location:      min []  Iontophoresis w/ dexamethasone, location:                                               []  take home patch-6hour remove at        []  in clinic   10 min [x]  Ice     []  Heat    location/position: Right shoulder    min []  Vasopneumatic Device, press/temp:     min []  Other:    [x] Skin assessment post-treatment (if applicable):    [x]  intact    []  redness- no adverse reaction     []redness  adverse reaction:      39 min Therapeutic Exercise:  [x]  See flow sheet   Rationale:      increase ROM, increase strength and improve coordination to improve the patients ability to return to prior level of physical activity.        10 min Manual Therapy: Inferior and posterior grade 1-2 mobs, PROM flex, ext and ER with Distraction   Rationale:      decrease pain, increase ROM and increase tissue extensibility to improve patient's ability to return to prior level of physical activity. min Patient Education:  YES  Reviewed HEP   []  Progressed/Changed HEP based on: Other Objective/Functional Measures: FOTO: 55     Post Treatment Pain Level (on 0 to 10) scale:   2  / 10     ASSESSMENT    Assessment/Changes in Function:     Pt tolerated ex well but did have mild increased pain and discomfort. Pt reports doing very little activity in general today which may be possible cause of stiffness. Pt received CP post tx.      []  See Progress Note/Recertification   Patient will continue to benefit from skilled PT services to analyze,, cue,, progress,, modify,, demonstrate,, instruct, and address, movement patterns,, therapeutic interventions,, postural abnormalities,, soft tissue restrictions,, ROM,, strength,, functional mobility,, body mechanics/ergonomics, and home and community integration, to attain remaining goals. Progress toward goals / Updated goals: · Short Term Goals: To be accomplished in  3 weeks:  1.  Patient will increase AROM R shoulder flexion to 115 degrees to improve overhead reach. AROM:   2.  Patient will increase strength with R shoulder ER to 3/5 to improve dynamic stability with ADL's. 3.  Patient will be compliant with HEP.     · Long Term Goals: To be accomplished in  6  weeks:  1.  Patient will increase AROM R shoulder flexion to 140 degrees to improve overhead reach. 2.  Patient will increase strength with R shoulder ER to 4/5 to improve dynamic stability with ADL's. 3.  Patient will increase FOTO Functional Status score to 62/100 to indicate decreased functional limitations. FOTO: 54     PLAN    [x]  Upgrade activities as tolerated YES Continue plan of care   []  Discharge due to :    []  Other:      Therapist: CHRISTINA Rosales    Date: 12/28/2017 Time: 4:19 PM   No future appointments.

## 2018-01-08 ENCOUNTER — APPOINTMENT (OUTPATIENT)
Dept: PHYSICAL THERAPY | Age: 63
End: 2018-01-08
Payer: OTHER MISCELLANEOUS

## 2018-01-10 ENCOUNTER — APPOINTMENT (OUTPATIENT)
Dept: PHYSICAL THERAPY | Age: 63
End: 2018-01-10
Payer: OTHER MISCELLANEOUS

## 2018-01-11 ENCOUNTER — HOSPITAL ENCOUNTER (OUTPATIENT)
Dept: PHYSICAL THERAPY | Age: 63
Discharge: HOME | End: 2018-01-11
Payer: OTHER MISCELLANEOUS

## 2018-01-11 PROCEDURE — 97530 THERAPEUTIC ACTIVITIES: CPT

## 2018-01-11 NOTE — PROGRESS NOTES
PHYSICAL THERAPY - DAILY TREATMENT NOTE      Patient Name: Sabina Covert        Date: 2018  : 1955   YES Patient  Verified  Visit #:   6     Insurance: Payor: /      In time: 4:00 Out time: 4:25   Total Treatment Time: 25 min       TREATMENT AREA =  Right shoulder pain [M25.511]    SUBJECTIVE     Pain Level (on 0 to 10 scale):  3  / 10   Medication Changes/New allergies or changes in medical history, any new surgeries or procedures? NO    If yes, update Summary List   Subjective Functional Status/Changes:  []  No changes reported     \"I do exercises every so often. \"        OBJECTIVE    5 min Therapeutic Exercise:  [x]  See flow sheet   Rationale:      increase ROM, increase strength, improve coordination and increase proprioception to improve the patients ability to increase pt's stability/mobility and improve functional activity and ability to perform ADL's    20 min Therapeutic Activity: FOTO, Re-evaluation   Rationale: To assess current functional limitations and review POC    1 min Patient Education:  YES  Reviewed HEP   []  Progressed/Changed HEP based on: Other Objective/Functional Measures:    See PN     Post Treatment Pain Level (on 0 to 10) scale:   2  / 10     ASSESSMENT    Assessment/Changes in Function:     See PN     []  See Progress Note/Recertification   Patient will continue to benefit from skilled PT services to modify and progress therapeutic interventions, address functional mobility deficits, address ROM deficits, address strength deficits, analyze and address soft tissue restrictions, analyze and cue movement patterns, analyze and modify body mechanics/ergonomics, assess and modify postural abnormalities and instruct in home and community integration to attain remaining goals.      Progress toward goals / Updated goals:    See PN       PLAN    [x]  Upgrade activities as tolerated YES Continue plan of care   []  Discharge due to :    []  Other: Therapist: Roger Colvin DPT, Cert.  DN    Date: 1/11/2018 Time: 11:46 AM     Future Appointments  Date Time Provider Nicole Drummondi   1/11/2018 4:00 PM [facility]   1/12/2018 9:00 AM [facility]   1/16/2018 5:30 PM César Juarez PT [facility]   1/18/2018 4:30 PM César Juarez PT [facility]   1/19/2018 8:30 AM César Juarez PT [facility]   1/23/2018 9:30 AM [facility]   1/25/2018 4:30 PM César Juarez PT [facility]   1/26/2018 9:00 AM [facility]

## 2018-01-11 NOTE — PROGRESS NOTES
[facility] PHYSICAL THERAPY  [address] [facility] - Phone: [phone]  Fax: [phone]  PROGRESS NOTE  Patient Name: Nishant Iverson : 1955   Treatment/Medical Diagnosis: Right shoulder pain [M25.511]   Referral Source: Anirudh Lin MD     Date of Initial Visit: 17 Attended Visits: 16 Missed Visits: See below     SUMMARY OF TREATMENT  Patient's POC has consisted of active warm-up on Fifi Nichols progressing to AROM interventions, scapulothoracic and gentle RTC strengthening, manual therapy to address soft tissue and joint mobility restrictions. Patient has been provided and instructed in comprehensive HEP with focus on restoring full PROM/AROM within tolerance. Key Functional Changes/Progress  Patient was evaluated at [facility] on 17 following D/C secondary to non-compliance at our sister clinic at [facility].. Patient has continued to demonstrate inconsistent attendance secondary to [facility] schedule. \" Patient's progress has been significantly limited due to this and lack of HEP compliance. At this time, patient reports pain levels 2-3/10 on a consistent basis. Patient has improved R shoulder AROM since evaluation here in December (see objective below), however moderate UT compensation noted at shoulder flexion > 100 degrees. I emphasized importance of daily, frequent HEP performance to maximize results; patient verbalized understanding and agreement. ROM:  [] Unable to assess at this time  [facility]                                               PROM     Right    Right   Flexion 120 Flexion 140   Extension 50 Extension NT    Scaption/ABD 95 Scaption/ABD 95   ER @ 0 Degrees 45 ER @ 0 Degrees 55     IR (HBB) To gluteals IR @ 90 Degrees           Goal/Measure of Progress Goal Met? · Short Term Goals: To be accomplished in  3 weeks:  1.   Patient will increase AROM R shoulder flexion to 115 degrees to improve overhead reach. 2.  Patient will increase strength with R shoulder ER to 3/5 to improve dynamic stability with ADL's.  3.  Patient will be compliant with HEP.        1. MET    2. MET    3. Not met     New Goals to be achieved in __4__  weeks:  1. Patient will increase AROM R shoulder flexion to 140 degrees to improve overhead reach. 2.  Patient will increase strength with R shoulder ER to 4/5 to improve dynamic stability with ADL's.  3.  Patient will increase FOTO Functional Status score to 62/100 to indicate decreased functional limitations. Frequency / Duration:   Patient to be seen  2-3  times per week for 4-6  weeks:    G-Codes: n/a    RECOMMENDATIONS  Continue and progress functional therex/therapeutic activity as able, utilizing manual therapy and modalities prn. Progress patient towards independent HEP to facilitate self-management of symptoms and progress gains after PT. If you have any questions/comments please contact us directly at [phone]. Thank you for allowing us to assist in the care of your patient. Therapist Signature: Bernadette Lambert DPT, Cert. DN Date: 5/89/6517   Certification Period: n/a     Reporting Period 12/13/17 - 1/11/18   Time: 4:03 PM   NOTE TO PHYSICIAN:  PLEASE COMPLETE THE ORDERS BELOW AND FAX TO   [facility] [place] [facility]: [phone]. If you are unable to process this request in 24 hours please contact our office: [phone].    ___ I have read the above report and request that my patient continue as recommended.   ___ I have read the above report and request that my patient continue therapy with the following changes/special instructions:_________________________________________________________   ___ I have read the above report and request that my patient be discharged from therapy.      Physician Signature:        Date:       Time:

## 2018-01-12 ENCOUNTER — HOSPITAL ENCOUNTER (OUTPATIENT)
Dept: PHYSICAL THERAPY | Age: 63
Discharge: HOME | End: 2018-01-12
Payer: OTHER MISCELLANEOUS

## 2018-01-12 PROCEDURE — 97110 THERAPEUTIC EXERCISES: CPT

## 2018-01-12 NOTE — PROGRESS NOTES
PHYSICAL THERAPY - DAILY TREATMENT NOTE      Patient Name: Ran Tineo        Date: 2018  : 1955   YES Patient  Verified  Visit #:     Insurance: Payor: [employer] / Plan: [address] / Product Type: Workers [employer] /      In time: 9:00 Out time: 9:47   Total Treatment Time: 47 min       TREATMENT AREA =  Right shoulder pain [M25.511]    SUBJECTIVE    Pain Level (on 0 to 10 scale):  3   10   Medication Changes/New allergies or changes in medical history, any new surgeries or procedures? NO    If yes, update Summary List   Subjective Functional Status/Changes:  []  No changes reported     \"It's always painful and the neck is so tight. I have fibromyalgia, so. Arvid Keanu Arvid Keanu Arvid Keanu \"        OBJECTIVE    47 min Therapeutic Exercise:  [x]  See flow sheet   Rationale:      increase ROM, increase strength, improve coordination and increase proprioception to improve the patients ability to increase pt's stability/mobility and improve functional activity and ability to perform ADL's    1 min Patient Education:  YES  Reviewed HEP   []  Progressed/Changed HEP based on:   Issued HEP (see chart)     Other Objective/Functional Measures:    Intermittent cueing required during wand therex for proper performance and to dec compensatory movements  VC/TC to decreased L trunk rotation with TB IR     Post Treatment Pain Level (on 0 to 10) scale:   2  / 10     ASSESSMENT    Assessment/Changes in Function:     Progressed/reviewed AAROM therex and issued HEP. Ensure patient is compliant with prescribed therex as this has been issue up to this point and limiting pt progression.      []  See Progress Note/Recertification   Patient will continue to benefit from skilled PT services to modify and progress therapeutic interventions, address functional mobility deficits, address ROM deficits, address strength deficits, analyze and address soft tissue restrictions, analyze and cue movement patterns, analyze and modify body mechanics/ergonomics, assess and modify postural abnormalities and instruct in home and community integration to attain remaining goals. Progress toward goals / Updated goals:    New Goals to be achieved in __4__  weeks:  1.  Patient will increase AROM R shoulder flexion to 140 degrees to improve overhead reach. Addressed with wand therex and HEP handout  2.  Patient will increase strength with R shoulder ER to 4/5 to improve dynamic stability with ADL's. TB ER  3.  Patient will increase FOTO Functional Status score to 62/100 to indicate decreased functional limitations. PLAN    [x]  Upgrade activities as tolerated YES Continue plan of care   []  Discharge due to :    []  Other:      Therapist: Greta Cazares DPT, Cert.  DN    Date: 1/12/2018 Time: 8:01 AM     Future Appointments  Date Time Provider Nicole Noguera   1/12/2018 9:00 AM [facility]   1/16/2018 5:30 PM [place] [facility], [facility]   1/18/2018 4:30 PM [place] [facility], PT [facility]   1/19/2018 8:30 AM [place] [facility], [facility]   1/23/2018 9:30 AM [facility]   1/25/2018 4:30 PM [place] [facility], [facility]   1/26/2018 9:00 AM [facility]

## 2018-01-18 ENCOUNTER — APPOINTMENT (OUTPATIENT)
Dept: PHYSICAL THERAPY | Age: 63
End: 2018-01-18
Payer: OTHER MISCELLANEOUS

## 2018-01-19 ENCOUNTER — APPOINTMENT (OUTPATIENT)
Dept: PHYSICAL THERAPY | Age: 63
End: 2018-01-19
Payer: OTHER MISCELLANEOUS

## 2018-01-23 ENCOUNTER — APPOINTMENT (OUTPATIENT)
Dept: PHYSICAL THERAPY | Age: 63
End: 2018-01-23
Payer: OTHER MISCELLANEOUS

## 2018-01-24 NOTE — PROGRESS NOTES
[address]  [facility] PHYSICAL THERAPY  [address] [facility] [address], [address] - Phone: [phone]  Fax: [phone]    Dear Aleta Ormond, MD, under your direction, we have been providing physical therapy for your patient Cabrera Caraballo, for a diagnosis of Right shoulder pain [M25.511] s/p R RTC repair 9/15/17. The patient evaluated at [facility] on 12/13/17 following D/C from a sister facility d/t non-compliance and poor attendance. Patient was seen here for 7 visits from 12/13/17 - 1/12/18 with 9 no-shows and/or cancellations during that time period. Patient was re-evaluated on 1/12/18 and scheduled for 2x/week for 4 weeks. The patient failed to show for all remaining appointments and will be D/C d/t non-compliance at this time. Due to the inability to further their care from non-attendance, we are discharging the patient from physical therapy at this time. We appreciate the kind referral and would willingly work with this patient again, if she needs further outpatient physical therapy. Your patient's health is our primary concern. Should you have any further questions or concerns, please feel free to contact me at your convenience.     Sincerely,     Cruz Wolfe DPT                  1/24/2018      NOTE TO PHYSICIAN:  PLEASE COMPLETE THE ORDERS BELOW AND FAX TO   [facility], [place] [facility]: [phone]  If you are unable to process this request in 24 hours please contact our office: [phone]     ___ I have read the above report and request that my patient continue as recommended.   ___ I have read the above report and request that my patient continue therapy with the following changes/special instructions:_________________________________________________________   ___ I have read the above report and request that my patient be discharged from therapy.      Physician Signature: Date:                                                                      Time:

## 2018-01-25 ENCOUNTER — APPOINTMENT (OUTPATIENT)
Dept: PHYSICAL THERAPY | Age: 63
End: 2018-01-25
Payer: OTHER MISCELLANEOUS

## 2018-01-26 ENCOUNTER — APPOINTMENT (OUTPATIENT)
Dept: PHYSICAL THERAPY | Age: 63
End: 2018-01-26
Payer: OTHER MISCELLANEOUS

## 2023-10-25 NOTE — PROGRESS NOTES
[address] [place] [address] PHYSICAL THERAPY  [address] [place] [address], [place] [address],[facility], [address] [facility] - Phone: [phone]  Fax: [phone] SUMMARY  Patient Name: Jevon Shukla : 1955   Treatment/Medical Diagnosis: Right shoulder pain [M25.511]   Referral Source: Willie Severe, MD     Date of Initial Visit: 17 Attended Visits: 10 Missed Visits: [place] has been seen at our clinic 2-3x/wk for a total of 10 visits. Pt treatment has consisted of  therapeutic exercise for shoulder ROM, shoulder/scapular stability, and manual therapy (jt mobilization and deep tissue mobilization)  CURRENT STATUS  Pt has had a good tolerance to physical therapy treatment. She has missed multiple scheduled appointment. Due to our no show/cancellation policy, we are discharging Ms. Verner Jewel from PT treatment at this time. RECOMMENDATIONS  Discharge from physical therapy treatment with HEP. If you have any questions/comments please contact us directly at [phone]. Thank you for allowing us to assist in the care of your patient.     Therapist Signature: Soto Dalton DPT, JERRY, SCS, CSCS Date: 2017     Time: 6:55 AM Clothing